# Patient Record
(demographics unavailable — no encounter records)

---

## 2025-03-31 NOTE — ASSESSMENT
[FreeTextEntry1] : 54 year old male with balanitis and phimosis  A1c 8.1 (3/25)  - UA/UCx - Discussed options including creams and circumcision. Would ideally like his a1c to be less than 8 prior to a circumcision - Will start with betamethasone-clotrimazole cream x 2 weeks - RTC 2-3 months

## 2025-03-31 NOTE — HISTORY OF PRESENT ILLNESS
[FreeTextEntry1] : 54 year old male with phimosis  Started after beginning jardiance Developed tearing at the foreskin with sexual activity Then switched to janumet  Still having tearing with erections and intercourse Pain with urination  PMH: HTN, DM a1c 8.1 (March 2025) PSx: gallbladder 1998

## 2025-03-31 NOTE — PHYSICAL EXAM
[General Appearance - Well Developed] : well developed [General Appearance - Well Nourished] : well nourished [de-identified] : Uncirc phallus, phimosis/balanitis